# Patient Record
Sex: FEMALE | Race: WHITE | NOT HISPANIC OR LATINO | Employment: UNEMPLOYED | URBAN - METROPOLITAN AREA
[De-identification: names, ages, dates, MRNs, and addresses within clinical notes are randomized per-mention and may not be internally consistent; named-entity substitution may affect disease eponyms.]

---

## 2021-01-30 ENCOUNTER — HOSPITAL ENCOUNTER (EMERGENCY)
Facility: MEDICAL CENTER | Age: 21
End: 2021-01-30
Attending: EMERGENCY MEDICINE

## 2021-01-30 VITALS
HEIGHT: 64 IN | HEART RATE: 71 BPM | SYSTOLIC BLOOD PRESSURE: 127 MMHG | WEIGHT: 176.37 LBS | BODY MASS INDEX: 30.11 KG/M2 | TEMPERATURE: 99 F | OXYGEN SATURATION: 99 % | RESPIRATION RATE: 16 BRPM | DIASTOLIC BLOOD PRESSURE: 79 MMHG

## 2021-01-30 DIAGNOSIS — K02.9 DENTAL CARIES: ICD-10-CM

## 2021-01-30 DIAGNOSIS — K05.10 GINGIVITIS: ICD-10-CM

## 2021-01-30 PROCEDURE — 99282 EMERGENCY DEPT VISIT SF MDM: CPT | Mod: EDC

## 2021-01-30 RX ORDER — CHLORHEXIDINE GLUCONATE ORAL RINSE 1.2 MG/ML
15 SOLUTION DENTAL 2 TIMES DAILY
Qty: 118 ML | Refills: 0 | Status: SHIPPED | OUTPATIENT
Start: 2021-01-30

## 2021-01-30 RX ORDER — AMOXICILLIN 500 MG/1
1000 CAPSULE ORAL 2 TIMES DAILY
Qty: 28 CAP | Refills: 0 | Status: SHIPPED | OUTPATIENT
Start: 2021-01-30 | End: 2021-02-06

## 2021-01-30 SDOH — HEALTH STABILITY: MENTAL HEALTH: HOW OFTEN DO YOU HAVE A DRINK CONTAINING ALCOHOL?: 2-3 TIMES A WEEK

## 2021-01-30 SDOH — HEALTH STABILITY: MENTAL HEALTH: HOW MANY STANDARD DRINKS CONTAINING ALCOHOL DO YOU HAVE ON A TYPICAL DAY?: 3 OR 4

## 2021-01-30 SDOH — HEALTH STABILITY: MENTAL HEALTH: HOW OFTEN DO YOU HAVE 6 OR MORE DRINKS ON ONE OCCASION?: NEVER

## 2021-01-30 NOTE — ED NOTES
"First interaction with patient.  Assumed care at this time.  Patient presents to the ER today for complaint of gingival pain, swelling and ulceration \"for a few days.\"  She contacted an on-call dentist at [facility], but they are unable to see her for another 4 days.  She denies chewing tobacco use, but reports that she does smoke cigarettes.      Gown provided, patient instructed to changed prior to ERP evaluation.  NPO status explained by this RN.  Call light provided.  Chart up for ERP.    This RN provided education to patient about the importance of keeping mask in place over both mouth and nose for entire duration of ER visit.  "

## 2021-01-30 NOTE — ED NOTES
"Jazlyn Verdugo has been discharged from the Emergency Room.    Discharge instructions, which include signs and symptoms to monitor at home for, as well as detailed information regarding gingivitis provided.  All questions and concerns addressed at this time.      Prescription for amoxicillin and peridex provided to patient.   Patient educated about the importance of completing the full 7 day course of antibiotic, even if symptoms subside, verbalized understanding.    Patient leaves ER in no apparent distress. This RN provided education regarding returning to the ER for any new concerns or changes in patient's condition.      /79   Pulse 71   Temp 37.2 °C (99 °F) (Temporal)   Resp 16   Ht 1.626 m (5' 4\")   Wt 80 kg (176 lb 5.9 oz)   LMP 01/30/2021   SpO2 99%   BMI 30.27 kg/m²   "

## 2021-01-30 NOTE — ED TRIAGE NOTES
"Jazlyn Verdugo  Chief Complaint   Patient presents with   • Dental Pain     Pt complains that her gums are very painful and she is noted  to have some gum ulceration to her bottom from jaw.      Pt AMBULATORY to triage with above complaint.     /71   Pulse 79   Temp 36.6 °C (97.9 °F) (Temporal)   Resp 16   Ht 1.626 m (5' 4\")   Wt 80 kg (176 lb 5.9 oz)   LMP 01/30/2021   SpO2 100%   BMI 30.27 kg/m²     Pt informed of triage process and encouraged to notify staff of any changes or concerns. Pt verbalized understanding of instructions. Apologized for long wait time. Pt placed back in lobby.     "

## 2021-01-30 NOTE — ED PROVIDER NOTES
"ED Provider Note      CHIEF COMPLAINT  Chief Complaint   Patient presents with   • Dental Pain     Pt complains that her gums are very painful and she is noted  to have some gum ulceration to her bottom from jaw.        HPI  Jazlyn Verdugo is a 20 y.o. female who presents with dental pain.  Pain has known cavities and does not brush her teeth very much.  She has had progressive swelling of her lower gingiva over the last week.  Increased swelling over the last few days with some firmness in the left jaw.  No fevers.  No difficulty breathing.  No skin rash or trauma.    REVIEW OF SYSTEMS  As per HPI    PAST MEDICAL HISTORY  History reviewed. No pertinent past medical history.    SOCIAL HISTORY  Social History     Tobacco Use   • Smoking status: Current Every Day Smoker     Packs/day: 0.50   • Smokeless tobacco: Never Used   Substance Use Topics   • Alcohol use: Yes     Frequency: 2-3 times a week     Drinks per session: 3 or 4     Binge frequency: Never   • Drug use: Yes     Types: Inhaled     Comment: THC       ALLERGIES  Allergies   Allergen Reactions   • Vicodin [Apap-Fd&C Yellow #10 Al Pelletier-Hydrocodone]      Rash to hands, feet, chest.        PHYSICAL EXAM  VITAL SIGNS: /71   Pulse 79   Temp 36.6 °C (97.9 °F) (Temporal)   Resp 16   Ht 1.626 m (5' 4\")   Wt 80 kg (176 lb 5.9 oz)   LMP 01/30/2021   SpO2 100%   BMI 30.27 kg/m²   Constitutional: Well developed, Well nourished, No acute distress, Non-toxic appearance.   HENT:  Atraumatic, Normocephalic. Bilateral external ears normal, Oropharynx multiple dental caries.  There is significant edema of the gingiva throughout more in the lower external gingiva with erosion at the base of the teeth.  Few scattered ulcers.  Eyes: Grossly Normal inspection  Neck: Normal range of motion  Cardiovascular: Normal heart rate  Thorax & Lungs: No respiratory distress  Skin: No rash.     COURSE & MEDICAL DECISION MAKING  Patient presents with dental caries and " severe gingivitis.  She will be treated with amoxicillin tablets and Peridex mouthwash.  She certainly needs to see a dentist and I stressed this.  She is to return to the ER for increased swelling, fevers or concern.    FINAL IMPRESSION  1. dental caries with severe gingivitis      This dictation was created using voice recognition software. The accuracy of the dictation is limited to the abilities of the software.   The nursing notes were reviewed and certain aspects of this information were incorporated into this note.      Electronically signed by: Tacho Ignacio M.D., 1/30/2021 11:49 AM

## 2021-08-02 ENCOUNTER — HOSPITAL ENCOUNTER (EMERGENCY)
Facility: MEDICAL CENTER | Age: 21
End: 2021-08-02
Attending: EMERGENCY MEDICINE
Payer: MEDICAID

## 2021-08-02 ENCOUNTER — APPOINTMENT (OUTPATIENT)
Dept: RADIOLOGY | Facility: MEDICAL CENTER | Age: 21
End: 2021-08-02
Attending: EMERGENCY MEDICINE
Payer: MEDICAID

## 2021-08-02 VITALS
TEMPERATURE: 97.6 F | BODY MASS INDEX: 25.61 KG/M2 | SYSTOLIC BLOOD PRESSURE: 111 MMHG | OXYGEN SATURATION: 99 % | HEIGHT: 64 IN | HEART RATE: 62 BPM | WEIGHT: 150 LBS | RESPIRATION RATE: 16 BRPM | DIASTOLIC BLOOD PRESSURE: 74 MMHG

## 2021-08-02 DIAGNOSIS — S93.602A SPRAIN OF LEFT FOOT, INITIAL ENCOUNTER: ICD-10-CM

## 2021-08-02 LAB — HCG UR QL: NEGATIVE

## 2021-08-02 PROCEDURE — 81025 URINE PREGNANCY TEST: CPT

## 2021-08-02 PROCEDURE — 99284 EMERGENCY DEPT VISIT MOD MDM: CPT | Mod: EDC

## 2021-08-02 PROCEDURE — A9270 NON-COVERED ITEM OR SERVICE: HCPCS | Performed by: EMERGENCY MEDICINE

## 2021-08-02 PROCEDURE — 73630 X-RAY EXAM OF FOOT: CPT | Mod: LT

## 2021-08-02 PROCEDURE — 302874 HCHG BANDAGE ACE 2 OR 3"": Mod: EDC

## 2021-08-02 PROCEDURE — 700102 HCHG RX REV CODE 250 W/ 637 OVERRIDE(OP): Performed by: EMERGENCY MEDICINE

## 2021-08-02 RX ORDER — IBUPROFEN 600 MG/1
600 TABLET ORAL ONCE
Status: COMPLETED | OUTPATIENT
Start: 2021-08-02 | End: 2021-08-02

## 2021-08-02 RX ADMIN — IBUPROFEN 600 MG: 600 TABLET ORAL at 19:30

## 2021-08-02 ASSESSMENT — LIFESTYLE VARIABLES
HAVE PEOPLE ANNOYED YOU BY CRITICIZING YOUR DRINKING: NO
CONSUMPTION TOTAL: NEGATIVE
TOTAL SCORE: 0
EVER FELT BAD OR GUILTY ABOUT YOUR DRINKING: NO
EVER HAD A DRINK FIRST THING IN THE MORNING TO STEADY YOUR NERVES TO GET RID OF A HANGOVER: NO
TOTAL SCORE: 0
DO YOU DRINK ALCOHOL: NO
ON A TYPICAL DAY WHEN YOU DRINK ALCOHOL HOW MANY DRINKS DO YOU HAVE: 0
HAVE YOU EVER FELT YOU SHOULD CUT DOWN ON YOUR DRINKING: NO
HOW MANY TIMES IN THE PAST YEAR HAVE YOU HAD 5 OR MORE DRINKS IN A DAY: 0
AVERAGE NUMBER OF DAYS PER WEEK YOU HAVE A DRINK CONTAINING ALCOHOL: 0
TOTAL SCORE: 0
DOES PATIENT WANT TO STOP DRINKING: NO

## 2021-08-02 NOTE — ED TRIAGE NOTES
"..  Chief Complaint   Patient presents with   • Foot Pain     left foot pain after dancing yesterday and landing wrong on her it. bruising towards the lateral side of foot. pt unable to bare weight and ambulate        ./63   Pulse 75   Temp 36.6 °C (97.9 °F) (Temporal)   Resp 16   Ht 1.626 m (5' 4\")   Wt 68 kg (150 lb)   SpO2 98%        Explained triage process, to waiting room. Asked to inform RN if questions or concerns arise.   "

## 2021-08-03 NOTE — ED PROVIDER NOTES
ED Provider Note    Scribed for Kamila Power M.D. by Christine Weiner. 8/2/2021, 6:52 PM.    Primary care provider: Pcp Pt States None  Means of arrival: Walk In  History obtained from: Patient  History limited by: None    CHIEF COMPLAINT  Chief Complaint   Patient presents with   • Foot Pain     left foot pain after dancing yesterday and landing wrong on her it. bruising towards the lateral side of foot. pt unable to bare weight and ambulate        HPI  Jazlyn Verdugo is a 21 y.o. female who presents to the Emergency Department for evaluation of left foot pain onset yesterday. She was dancing and landed on the side of the foot. She has been unable to walk secondary to the pain. She states that she is not in pain unless she walks on it. She denies associated ankle pain. She iced the foot for about an hour and a half after the incident and took one dose of ibuprofen yesterday. She has a history of endometriosis and is not on birth control. Her last menstrual period was about 3 weeks ago.       REVIEW OF SYSTEMS  Review of Systems   Musculoskeletal:        Positive for left foot pain   Skin:        Positive for eccymosis   All other systems reviewed and are negative.    PAST MEDICAL HISTORY   None    SURGICAL HISTORY  patient denies any surgical history    SOCIAL HISTORY  Social History     Tobacco Use   • Smoking status: Current Every Day Smoker     Packs/day: 0.50   • Smokeless tobacco: Never Used   Substance Use Topics   • Alcohol use: Yes   • Drug use: Yes     Types: Inhaled     Comment: THC      Social History     Substance and Sexual Activity   Drug Use Yes   • Types: Inhaled    Comment: THC       FAMILY HISTORY  History reviewed. No pertinent family history.    CURRENT MEDICATIONS  Reviewed.  See Encounter Summary.     ALLERGIES  Allergies   Allergen Reactions   • Vicodin [Apap-Fd&C Yellow #10 Al Pelletier-Hydrocodone]      Rash to hands, feet, chest.        PHYSICAL EXAM  VITAL SIGNS: /73   Pulse (!)  "51   Temp 36.6 °C (97.9 °F) (Temporal)   Resp 14   Ht 1.626 m (5' 4\")   Wt 68 kg (150 lb)   SpO2 100%   BMI 25.75 kg/m²   Constitutional: Alert in no apparent distress.  HENT: No signs of trauma, Bilateral external ears normal, Nose normal.   Eyes: Pupils are equal and reactive, Conjunctiva normal  Cardiovascular: Regular rate and rhythm  Thorax & Lungs: Normal breath sounds, No respiratory distress  Skin: Warm, Dry  Extremities: Intact distal pulses, No edema  Musculoskeletal: Tenderness over the 5th metatarsal area with some ecchymosis. Good range of motion in all major joints. No major deformities noted.   Neurologic: Alert , Normal motor function, Normal sensory function        DIAGNOSTIC STUDIES / PROCEDURES     LABS  Results for orders placed or performed during the hospital encounter of 08/02/21   POCT urine pregnancy device results   Result Value Ref Range    POC Urine Pregnancy Test Negative Negative      All labs were reviewed by me.    RADIOLOGY  DX-FOOT-COMPLETE 3+ LEFT   Final Result      No evidence of acute fracture or dislocation.        The radiologist's interpretation of all radiological studies have been reviewed by me.    COURSE & MEDICAL DECISION MAKING  Pertinent Labs & Imaging studies reviewed. (See chart for details)    6:52 PM - Patient seen and examined at bedside. Patient will be treated with Motrin 600 mg. Ordered DX-Foot and POCT Urine Pregnancy to evaluate her symptoms.     7:23 PM - Patient was reevaluated at bedside. Discussed lab and radiology results with the patient and informed them there are no acute abnormalities. Patient had the opportunity to ask any questions. The plan for discharge was discussed with the patient and she was told to return for any new or worsening symptoms. Patient is understanding and agreeable to the plan for discharge.     Decision Making:  This is a 21 y.o. year old female who presents with left foot pain after a fall yesterday.  On my exam, she was " well-appearing with normal vital signs.  She had tenderness over the left fifth metatarsal area.  X-rays obtained showed no evidence of acute fracture or dislocation.  Pregnancy testing was performed prior to x-ray and was negative.  Patient did receive ibuprofen for pain with good response.  I advised on usual course for a foot sprain and return precautions.  Patient expressed understanding was comfortable discharge home.    The patient will return for new or worsening symptoms and is stable at the time of discharge.  She was provided with crutches for ambulation and foot was wrapped with an Ace bandage.    The patient is referred to a primary physician for blood pressure management, diabetic screening, and for all other preventative health concerns.    DISPOSITION:  Patient will be discharged home in stable condition.    FOLLOW UP:  [facility], Emergency Dept  [facility] [facility] [address] [phone]    As needed    FINAL IMPRESSION  1. Sprain of left foot, initial encounter          Christine KELLY (Scribe), am scribing for, and in the presence of, Kamila Power M.D..    Electronically signed by: Christine Weiner (Scribe), 8/2/2021    IKamila M.D. personally performed the services described in this documentation, as scribed by Christine Weiner in my presence, and it is both accurate and complete. C.    The note accurately reflects work and decisions made by me.  Kamila Power M.D.  8/2/2021  9:09 PM

## 2021-08-03 NOTE — ED NOTES
First interaction with patient.  Assumed care at this time.  Patient reports that she was dancing last night and landed on the lateral side of her foot. Patient reports pain since. Patient unable to bear weight. Bruising and swelling to lateral left foot.   Call light and TV remote introduced.  Chart up for ERP.

## 2021-08-03 NOTE — ED TRIAGE NOTES
"Patient vital signs rechecked and documented per [facility]. Patient denies any new needs at this time.  Patient updated on wait times, thanked for patience. Pt informed to alert triage tech or triage RN with any needs and/or changes in condition; patient verbalized understanding.   Patient stated \"pain is the same. I'm good as long as i'm not walking on it. Sitting here, i'm a 2/10. Standing and walking on it, its a 10/10.\"   "

## 2021-08-03 NOTE — ED NOTES
"[facility]    Discharge instructions, which include signs and symptoms to monitor patient for, as well as detailed information regarding sprain provided.  All questions and concerns addressed at this time.    This RN also encouraged a follow- up appointment to be made with PCP.    Patient leaves ER in no apparent distress. This RN provided education regarding returning to the ER for any new concerns or changes in patient's condition.      /74   Pulse 62   Temp 36.4 °C (97.6 °F) (Temporal)   Resp 16   Ht 1.626 m (5' 4\")   Wt 68 kg (150 lb)   SpO2 99%   BMI 25.75 kg/m²     "

## 2021-11-23 ENCOUNTER — HOSPITAL ENCOUNTER (EMERGENCY)
Facility: MEDICAL CENTER | Age: 21
End: 2021-11-23
Attending: EMERGENCY MEDICINE
Payer: MEDICAID

## 2021-11-23 VITALS
SYSTOLIC BLOOD PRESSURE: 110 MMHG | HEART RATE: 70 BPM | HEIGHT: 65 IN | BODY MASS INDEX: 28.25 KG/M2 | OXYGEN SATURATION: 93 % | TEMPERATURE: 97 F | DIASTOLIC BLOOD PRESSURE: 66 MMHG | WEIGHT: 169.53 LBS | RESPIRATION RATE: 16 BRPM

## 2021-11-23 DIAGNOSIS — J02.0 STREP PHARYNGITIS: ICD-10-CM

## 2021-11-23 PROCEDURE — 99282 EMERGENCY DEPT VISIT SF MDM: CPT

## 2021-11-23 RX ORDER — AMOXICILLIN 500 MG/1
500 CAPSULE ORAL 2 TIMES DAILY
Qty: 20 CAPSULE | Refills: 0 | Status: SHIPPED | OUTPATIENT
Start: 2021-11-23 | End: 2021-12-03

## 2021-11-24 NOTE — ED TRIAGE NOTES
"Chief Complaint   Patient presents with   • Sore Throat     Pt has had a sore throat x1 week. Pt origianlly though it was allergies but states her girlfriend just came back positive with strep throat.      /65   Pulse 75   Temp 36.1 °C (96.9 °F) (Temporal)   Resp 16   Ht 1.651 m (5' 5\")   Wt 76.9 kg (169 lb 8.5 oz)   SpO2 91%   BMI 28.21 kg/m²     Patient arrived to ED for the above complaint. Triage process explained to patient. Patient placed in lobby and told to notify staff of any changes.   "

## 2021-11-24 NOTE — ED NOTES
Discharge instructions given to pt. Prescriptions sent to pt's pharmacy. Pt educated, verbalizes understanding. All belongings accounted for. Pt ambulated out of ED with steady gait to go home by self.

## 2021-11-24 NOTE — ED PROVIDER NOTES
"ER Provider Note     Scribed for Darrin Prater M.D. by Emily Blake. 11/23/2021, 6:38 PM.    Primary Care Provider: Pcp Pt States None  Means of Arrival: Walk in    History obtained from: Patient  History limited by: None     CHIEF COMPLAINT  Chief Complaint   Patient presents with    Sore Throat     Pt has had a sore throat x1 week. Pt origianlly though it was allergies but states her girlfriend just came back positive with strep throat.        HPI  Jazlyn Verdugo is a 21 y.o. female who presents to the Emergency Department for evaluation of worsening sore throat onset 1 week ago.  At first patient thought her symptoms were analogous with her allergy flare ups that are triggered by mold and mildew. She then reports that her girlfriend was diagnosed with Strep yesterday. She presents associated symptoms of cough. Denies fever. No alleviating factors noted at this time. No other pertinent history noted.     REVIEW OF SYSTEMS  See HPI for further details. All other systems are negative.     PAST MEDICAL HISTORY       SURGICAL HISTORY  patient denies any surgical history    SOCIAL HISTORY  Social History     Tobacco Use    Smoking status: Current Every Day Smoker     Packs/day: 0.50    Smokeless tobacco: Never Used   Substance Use Topics    Alcohol use: Yes    Drug use: Yes     Types: Inhaled     Comment: THC      Social History     Substance and Sexual Activity   Drug Use Yes    Types: Inhaled    Comment: THC       FAMILY HISTORY      CURRENT MEDICATIONS  Current Outpatient Medications   Medication Instructions    chlorhexidine (PERIDEX) 0.12 % Solution 15 mL, Oral, 2 TIMES DAILY      ALLERGIES  Allergies   Allergen Reactions    Vicodin [Apap-Fd&C Yellow #10 Al Pelletier-Hydrocodone]      Rash to hands, feet, chest.        PHYSICAL EXAM  VITAL SIGNS: /65   Pulse 75   Temp 36.1 °C (96.9 °F) (Temporal)   Resp 16   Ht 1.651 m (5' 5\")   Wt 76.9 kg (169 lb 8.5 oz)   SpO2 91%   BMI 28.21 kg/m²  " "    Constitutional: Alert in no apparent distress.  HENT: Normocephalic, Atraumatic, Bilateral external ears normal. Nose normal. Redness in posterior orophaynx  Eyes: Pupils are equal and reactive. Conjunctiva normal, non-icteric.   Heart: Regular rate and rythm,    Lungs: No respiratory distress   Skin: Warm, Dry, No erythema, No rash.   Neurologic: Alert, Grossly non-focal.   Psychiatric: Affect normal, Judgment normal, Mood normal, Appears appropriate and not intoxicated.     COURSE & MEDICAL DECISION MAKING  Pertinent Labs & Imaging studies reviewed. (See chart for details)    This is a 21 y.o. female that presents with symptoms concerning for strep throat.  At this time I spoke to the patient about getting strep swab.  Given the fact that she has such a close contact with a strep positive patient in the same symptoms I feel it is reasonable to not do a swab at this time empirically treat.  I did offer swab to the patient and she declined.  There is no stridor.  There is no abscess noted..     6:38 PM - Patient seen and examined at bedside. Informed patient about the likelihood of Strep infection especially since her girlfriend had tested positive for Strep. Informed patient about discharge medication orders for alleviation.I discussed with patient care of plan following discharge. Patient was given opportunity to ask questions at this time. Patient verbalizes understanding and agrees to care of plan.  Patient will be discharged at this time. Patient will be discharged with a prescription for Amoxil and a referral to establish PCP. Her vital signs prior to discharge: /65   Pulse 75   Temp 36.1 °C (96.9 °F) (Temporal)   Resp 16   Ht 1.651 m (5' 5\")   Wt 76.9 kg (169 lb 8.5 oz)   SpO2 91%   BMI 28.21 kg/m²       The patient will return for new or worsening symptoms and is stable at the time of discharge.    The patient is referred to a primary physician for blood pressure management, diabetic " screening, and for all other preventative health concerns.    DISPOSITION:  Patient will be discharged home in stable condition.    FOLLOW UP:  [address] [address] [phone]  Go in 2 days        OUTPATIENT MEDICATIONS:  New Prescriptions    AMOXICILLIN (AMOXIL) 500 MG CAP    Take 1 Capsule by mouth 2 times a day for 10 days.        FINAL IMPRESSION  1. Strep pharyngitis          Emily KELLY (Ace), am scribing for, and in the presence of, Darrin Prater M.D..    Electronically signed by: Emily Blake (Ace), 11/23/2021    IDarrin M.D. personally performed the services described in this documentation, as scribed by Emily Blake in my presence, and it is both accurate and complete.     E    The note accurately reflects work and decisions made by me.  Darrin Prater M.D.  11/24/2021  12:25 AM